# Patient Record
Sex: MALE | Race: WHITE | Employment: UNEMPLOYED | URBAN - METROPOLITAN AREA
[De-identification: names, ages, dates, MRNs, and addresses within clinical notes are randomized per-mention and may not be internally consistent; named-entity substitution may affect disease eponyms.]

---

## 2024-08-03 ENCOUNTER — HOSPITAL ENCOUNTER (EMERGENCY)
Facility: HOSPITAL | Age: 19
Discharge: HOME | End: 2024-08-03
Attending: EMERGENCY MEDICINE
Payer: MEDICAID

## 2024-08-03 ENCOUNTER — APPOINTMENT (OUTPATIENT)
Dept: GENERAL RADIOLOGY | Facility: HOSPITAL | Age: 19
End: 2024-08-03
Attending: EMERGENCY MEDICINE
Payer: MEDICAID

## 2024-08-03 VITALS
TEMPERATURE: 98 F | HEART RATE: 106 BPM | WEIGHT: 126.13 LBS | OXYGEN SATURATION: 100 % | BODY MASS INDEX: 19.12 KG/M2 | SYSTOLIC BLOOD PRESSURE: 120 MMHG | RESPIRATION RATE: 24 BRPM | HEIGHT: 68 IN | DIASTOLIC BLOOD PRESSURE: 65 MMHG

## 2024-08-03 DIAGNOSIS — S43.004A DISLOCATION OF RIGHT SHOULDER JOINT, INITIAL ENCOUNTER: Primary | ICD-10-CM

## 2024-08-03 PROCEDURE — 23650 CLTX SHO DSLC W/MNPJ WO ANES: CPT

## 2024-08-03 PROCEDURE — 96374 THER/PROPH/DIAG INJ IV PUSH: CPT

## 2024-08-03 PROCEDURE — 73030 X-RAY EXAM OF SHOULDER: CPT | Performed by: EMERGENCY MEDICINE

## 2024-08-03 PROCEDURE — 96375 TX/PRO/DX INJ NEW DRUG ADDON: CPT

## 2024-08-03 PROCEDURE — 99285 EMERGENCY DEPT VISIT HI MDM: CPT

## 2024-08-03 RX ORDER — MORPHINE SULFATE 4 MG/ML
4 INJECTION, SOLUTION INTRAMUSCULAR; INTRAVENOUS ONCE
Status: COMPLETED | OUTPATIENT
Start: 2024-08-03 | End: 2024-08-03

## 2024-08-03 RX ORDER — MIDAZOLAM HYDROCHLORIDE 1 MG/ML
INJECTION INTRAMUSCULAR; INTRAVENOUS
Status: COMPLETED
Start: 2024-08-03 | End: 2024-08-03

## 2024-08-03 RX ORDER — MIDAZOLAM HYDROCHLORIDE 1 MG/ML
1 INJECTION INTRAMUSCULAR; INTRAVENOUS ONCE
Status: COMPLETED | OUTPATIENT
Start: 2024-08-03 | End: 2024-08-03

## 2024-08-03 RX ORDER — KETOROLAC TROMETHAMINE 10 MG/1
10 TABLET, FILM COATED ORAL EVERY 6 HOURS PRN
Qty: 20 TABLET | Refills: 0 | Status: SHIPPED | OUTPATIENT
Start: 2024-08-03 | End: 2024-08-08

## 2024-08-03 RX ORDER — MORPHINE SULFATE 4 MG/ML
INJECTION, SOLUTION INTRAMUSCULAR; INTRAVENOUS
Status: COMPLETED
Start: 2024-08-03 | End: 2024-08-03

## 2024-08-03 RX ORDER — KETOROLAC TROMETHAMINE 15 MG/ML
15 INJECTION, SOLUTION INTRAMUSCULAR; INTRAVENOUS ONCE
Status: COMPLETED | OUTPATIENT
Start: 2024-08-03 | End: 2024-08-03

## 2024-08-04 NOTE — ED PROVIDER NOTES
Patient Seen in: [facility] Emergency Department    History     Chief Complaint   Patient presents with    Shoulder Injury     Stated Complaint: right shoulder injury; dislocation     HPI    20 yo right hand dominant M without PMH aside from recurrent/prior dislocation including left x 1 (s/p operative management) and right x 1 presenting by EMS from [facility] for evaluation of right shoulder pain similar to prior dislocation as noted while playing dodgeball and throwing ball with pain thereafter. No focal weakness/paresthesias.    No past medical history on file.    No past surgical history on file.         No family history on file.         Review of Systems :  Constitutional: As per HPI   Musculoskeletal: (+) shoulder pain.    Positive for stated complaint: right shoulder injury; dislocation  Other systems are as noted in HPI.  Constitutional and vital signs reviewed.      All other systems reviewed and negative except as noted above.    PSFH elements reviewed from today and agreed except as otherwise stated in HPI.    Physical Exam     ED Triage Vitals [08/03/24 2018]   /73   Pulse 103   Resp 16   Temp 98.3 °F (36.8 °C)   Temp src Oral   SpO2 98 %   O2 Device None (Room air)       Current:/73   Pulse 103   Temp 98.3 °F (36.8 °C) (Oral)   Resp 16   Ht 172.7 cm (5' 8\")   Wt 57.6 kg   SpO2 98%   BMI 19.31 kg/m²         Physical Exam   Constitutional: No distress.   HEENT: MMM.  Head: Normocephalic.   Eyes: No injection.   Cardiovascular: RUE with 2+ radial pulse.   Pulmonary/Chest: Effort normal.  Musculoskeletal: Right shoulder with intact skin though with flattened/loss of deltoid contour and held in mild abduction/external rotation.  Neurological: Alert. RUE with 5/5 strength to elbow/wrist and shoulder ROM limited secondary to pain.  Skin: Skin is warm.   Psychiatric: Cooperative.  Nursing note and vitals reviewed.    ED Course   Labs Reviewed - No data to display  Preliminary Radiology  Report  [facility], [facility]  [phone] - Phone    [facility]    NAME: ROQUE GLEASON    DATE OF EXAM: 08/03/2024  Patient No:  [id number]  Physician:  AWA^PERICO ^2  YOB: 2005    Past Medical History (entered by Technologist):    Reason For Exam (entered by Technologist):  Right shoulder dislocation today.  Other Notes (entered by Technologist): ER POD 3 ROOM 34    Additional Information (per [facility]):      Right shoulder radiographs  No priors    IMPRESSION:    There is anterior-inferior dislocation of the right humeral head relative to the glenoid fossa.  No definite fracture is seen.    Case results were faxed/electronically transmitted at 10:41 PM ET.  If there are any questions please feel free to contact me directly at [phone]  ext [phone]. If you cannot reach me at this number, do not leave a voicemail. Please call [phone] ext [phone] and ask for the next available radiologist.    Dr. Unique Nguyen MD  This report has been electronically signed and verified by the Radiologist whose name is printed above.       This report contains privileged and confidential information and is intended solely for the use of the individual or entity to which it is addressed. If you are not the intended recipient of this report, you are hereby notified that any copying, distribution, dissemination or action taken in relation to the contents of this report is strictly prohibited and may be unlawful. If you have received this report in error, please notify the sender immediately at [phone] and permanently delete the original report and destroy any copies or printouts.    Procedure:  Procedural sedation:  A pre-sedation evaluation was completed on the patient at 2101.  Patient is an appropriate candidate for procedural sedation.  The risks of the sedation were discussed with the patient.  A time out was completed.  The patient was sedated with propofl.  The patient was monitored  with continuous pulse oximetry and EKG monitor.  There were no complications and no significant hypoxemia.  I remained at the bedside for the sedation.  The total time I spent in the procedural sedation was 7 minutes.    The patient required sedation for shoulder reduction.  The risks, benefits, and alternatives were discussed with the patient and/or the family who consented.  The patient had a screening history (including review of previous problems with anesthesia and history of heavy snoring or sleep apnea)  and exam completed and there are no contraindications to sedation.  ASA designation is ASA 1 - Normal health patient.  Mallampati score: I (soft palate, uvula, fauces, and tonsillar pillars visible).  The patient was placed on monitors and was under constant nursing observation.  Under my direct supervision the patient was given propofol.  An appropriate level of sedation was achieved.  The patient remained hemodynamically stable with normal oxygen saturations during the procedure.  There were no complications related to the sedation.  Patient was observed until mental status returned to baseline.  Patient was subsequently deemed appropriate for discharge home with responsible caretaker.  The sedation lasted 7 minutes during which I was present.  PROCEDURE NOTE:    Dislocation reduction: Informed consent obtained from the patient. The right shoulder was reduced in the usual fashion without complications.  Post reduction the patient's neurovascular exam is normal. Post reduction x-ray demonstrates reduction of the joint to the anatomic position. The procedure was performed by myself.  Procedure:    A shoulder immobilizer was applied.  After application of the splint I returned and re-examined the patient.  The splint was adequately immobilizing the joint and distal to the splint the patient's circulation and sensation was intact.      ED Course as of 08/03/24  2200  ------------------------------------------------------------  Time: 08/03 2037  Comment: Unable to reduce despite midazolam/morphine  ------------------------------------------------------------  Time: 08/03 2113  Comment: Shoulder reduced after propofol/conscious sedation.       MDM   DIFFERENTIAL DIAGNOSIS: After history and physical exam differential diagnosis includes but is not limited to fracture, dislocation.    Pulse ox: 98%:Normal on RA, as independently interpreted by myself    Cardiac Monitor Interpretation:   Pulse Readings from Last 1 Encounters:   08/03/24 103   , sinus,      Medical Decision Making  Evaluation for right shoulder pain without neurovascular compromise and pain similar to dislocation - ongoing muscle spasm and unable to reduce with parenteral meds; informed consent for sedation/reduction obtained and shoulder thereafter reduced. Stable for discharge with outpatient followup.    Problems Addressed:  Dislocation of right shoulder joint, initial encounter: acute illness or injury    Amount and/or Complexity of Data Reviewed  Independent Historian: EMS     Details: Collateral history obtained from EMS  Radiology: ordered and independent interpretation performed. Decision-making details documented in ED Course.     Details: Radiography notable for dislocation as independently interpreted by myself    Risk  Prescription drug management.  Parenteral controlled substances.  Drug therapy requiring intensive monitoring for toxicity.      I was wearing at minimum a facemask and eye protection throughout this encounter with handwashing performed prior and after patient evaluation without personal hand/facial/oropharyngeal contact and gloves worn throughout encounter. See note and/or contact this provider for further PPE details.        Disposition and Plan     Clinical Impression:  1. Dislocation of right shoulder joint, initial encounter         Disposition:  Discharge    Follow-up:  Bernardo Jimenez MD  [address]  [facility] [address] [phone]    Call  For orthopedic followup and re-evaluation.      Medications Prescribed:  Discharge Medication List as of 8/3/2024 11:15 PM        START taking these medications    Details   Ketorolac Tromethamine 10 MG Oral Tab Take 1 tablet (10 mg total) by mouth every 6 (six) hours as needed for Pain., Print, Disp-20 tablet, R-0

## 2024-08-04 NOTE — ED QUICK NOTES
Patient awake, alert, no signs of distress. O2 discontinued and patient continues to have stable vitals. Family at bedside.

## 2024-08-04 NOTE — ED INITIAL ASSESSMENT (HPI)
Patient presents to ED via EMS after throwing a doge ball, causing him to dislocate his right shoulder. No medication, given ice pack by EMS. CMS intact.

## 2024-08-04 NOTE — ED QUICK NOTES
Rounding Completed    Plan of Care reviewed. Waiting for XRAY post-reduction.  Elimination needs assessed.  Respirations regular and unlabored. No distress noted.    Bed is locked and in lowest position. Call light within reach.

## 2024-08-04 NOTE — ED QUICK NOTES
Patient sedated with MD manipulating shoulder. Patient being cardiac monitored with O2 and Capnography.